# Patient Record
Sex: FEMALE | Race: WHITE | HISPANIC OR LATINO | Employment: FULL TIME | URBAN - METROPOLITAN AREA
[De-identification: names, ages, dates, MRNs, and addresses within clinical notes are randomized per-mention and may not be internally consistent; named-entity substitution may affect disease eponyms.]

---

## 2018-02-10 ENCOUNTER — OFFICE VISIT (OUTPATIENT)
Dept: URGENT CARE | Facility: MEDICAL CENTER | Age: 18
End: 2018-02-10
Payer: COMMERCIAL

## 2018-02-10 VITALS
DIASTOLIC BLOOD PRESSURE: 60 MMHG | HEIGHT: 61 IN | BODY MASS INDEX: 23.79 KG/M2 | RESPIRATION RATE: 16 BRPM | HEART RATE: 76 BPM | OXYGEN SATURATION: 100 % | WEIGHT: 126 LBS | SYSTOLIC BLOOD PRESSURE: 118 MMHG | TEMPERATURE: 97.4 F

## 2018-02-10 DIAGNOSIS — IMO0001 AVULSION OF NAIL PLATE, INITIAL ENCOUNTER: Primary | ICD-10-CM

## 2018-02-10 PROCEDURE — G0463 HOSPITAL OUTPT CLINIC VISIT: HCPCS | Performed by: FAMILY MEDICINE

## 2018-02-10 PROCEDURE — G0382 LEV 3 HOSP TYPE B ED VISIT: HCPCS | Performed by: FAMILY MEDICINE

## 2018-02-11 NOTE — PATIENT INSTRUCTIONS
Patient had partial avulsion of the nail plate of the left 4th finger which was hanging by  Cuticle margin which was excised with scissors without any immediate complication  Sterile dressing applied  Advised to keep area clean and dry and watch for any signs of infection and if any, follow up with primary care doctor for evaluation  Tetanus shot up-to-date according to patient  May use Tylenol/ Motrin as needed for pain and discomfort

## 2018-02-11 NOTE — PROGRESS NOTES
Assessment/Plan:  Patient Instructions    Patient had partial avulsion of the nail plate of the left 4th finger which was hanging by  Cuticle margin which was excised with scissors without any immediate complication  Sterile dressing applied  Advised to keep area clean and dry and watch for any signs of infection and if any, follow up with primary care doctor for evaluation  Tetanus shot up-to-date according to patient  May use Tylenol/ Motrin as needed for pain and discomfort  No problem-specific Assessment & Plan notes found for this encounter  Diagnoses and all orders for this visit:    Avulsion of nail plate, initial encounter          Vitals:    02/10/18 1936   BP: 118/60   Pulse: 76   Resp: 16   Temp: (!) 97 4 °F (36 3 °C)   SpO2: 100%       Subjective:      Patient ID: Jameson Chaney is a 21 y o  female  Patient states that she was putting her  baby in the car seat  When her o acrylic nail of the left 4th finger bent backwards and now she has pain and mild bleeding in the same area for which she is coming  She denies any other significant injury to hand or finger  She is able to flex and extend her finger without any significant pain or discomfort  The following portions of the patient's history were reviewed and updated as appropriate:   She  has no past medical history on file  No current outpatient prescriptions on file  No current facility-administered medications for this visit  No current outpatient prescriptions on file prior to visit  No current facility-administered medications on file prior to visit       Review of Systems   Constitutional: Negative for activity change and appetite change  Fever: As noted in HPI  Respiratory: Cough: As noted in HPI  Gastrointestinal: Negative for abdominal pain, diarrhea and nausea  Objective:     Physical Exam   Constitutional: She appears well-developed and well-nourished     Musculoskeletal: Left hand: She exhibits tenderness  She exhibits normal range of motion, no bony tenderness, normal capillary refill, no deformity, no laceration and no swelling  Decreased sensation is not present in the ulnar distribution, is not present in the medial redistribution and is not present in the radial distribution  She exhibits no finger abduction and no thumb/finger opposition  Hands:      Nail plate is fractured at the prox end of  Acrylic nail and avulsed from underlying nailbed and hanging by cuticle margin which was excised using a scissors without any immediate complications  Area was cleansed with Betadine and sterile dressing applied      Jossie Hinds MD

## 2019-06-27 ENCOUNTER — HOSPITAL ENCOUNTER (EMERGENCY)
Facility: HOSPITAL | Age: 19
Discharge: HOME | End: 2019-06-27
Attending: EMERGENCY MEDICINE
Payer: COMMERCIAL

## 2019-06-27 ENCOUNTER — APPOINTMENT (EMERGENCY)
Dept: RADIOLOGY | Facility: HOSPITAL | Age: 19
End: 2019-06-27
Payer: COMMERCIAL

## 2019-06-27 VITALS
TEMPERATURE: 98.6 F | HEART RATE: 87 BPM | RESPIRATION RATE: 16 BRPM | BODY MASS INDEX: 26.49 KG/M2 | DIASTOLIC BLOOD PRESSURE: 59 MMHG | SYSTOLIC BLOOD PRESSURE: 122 MMHG | WEIGHT: 140.21 LBS | OXYGEN SATURATION: 100 %

## 2019-06-27 DIAGNOSIS — S93.402A LEFT ANKLE SPRAIN: Primary | ICD-10-CM

## 2019-06-27 PROCEDURE — 73610 X-RAY EXAM OF ANKLE: CPT

## 2019-06-27 PROCEDURE — 99283 EMERGENCY DEPT VISIT LOW MDM: CPT | Performed by: PHYSICIAN ASSISTANT

## 2019-06-27 PROCEDURE — 99283 EMERGENCY DEPT VISIT LOW MDM: CPT

## 2019-06-27 RX ORDER — IBUPROFEN 600 MG/1
600 TABLET ORAL EVERY 8 HOURS PRN
Qty: 30 TABLET | Refills: 0 | Status: SHIPPED | OUTPATIENT
Start: 2019-06-27 | End: 2019-07-04

## 2019-06-28 NOTE — ED PROVIDER NOTES
History  Chief Complaint   Patient presents with    Ankle Injury     Pt reports " I twisted my left ankle around 5pm", pt reports swelling  24-year-old female presents emergency room for evaluation of left ankle injury  Patient states she is walking on her mom's deck when she twisted it  Incident happened earlier today around 5:00 p m  Jose Alejandro Wagner Denies knee pain  Denies other injury  She states it is very swollen  She is able to move it however only in the 1 direction  Denies previous left ankle surgery  Able to bear weight  Ankle Injury   Associated symptoms: no fever and no rash        Prior to Admission Medications   Prescriptions Last Dose Informant Patient Reported? Taking? methylPREDNISolone (MEDROL DOSEPACK) 4 MG tablet   No No   Sig: follow package directions start tomorrow      Facility-Administered Medications: None       Past Medical History:   Diagnosis Date    No known health problems        History reviewed  No pertinent surgical history  History reviewed  No pertinent family history  I have reviewed and agree with the history as documented  Social History     Tobacco Use    Smoking status: Never Smoker    Smokeless tobacco: Never Used   Substance Use Topics    Alcohol use: Yes     Comment: ocass   Drug use: No        Review of Systems   Constitutional: Negative for chills and fever  Musculoskeletal: Positive for joint swelling  Skin: Negative for rash and wound  Neurological: Negative for weakness and numbness  Physical Exam  Physical Exam   Constitutional: She appears well-developed and well-nourished  Cardiovascular: Intact distal pulses  Musculoskeletal:        Left knee: Normal         Left ankle: She exhibits decreased range of motion, swelling and ecchymosis  She exhibits no deformity and normal pulse  Tenderness  Lateral malleolus tenderness found  No head of 5th metatarsal and no proximal fibula tenderness found   Achilles tendon normal         Left foot: Normal    Skin: Skin is warm and dry  Nursing note and vitals reviewed  Vital Signs  ED Triage Vitals   Temperature Pulse Respirations Blood Pressure SpO2   06/27/19 2240 06/27/19 2242 06/27/19 2242 06/27/19 2242 06/27/19 2242   98 6 °F (37 °C) 87 16 122/59 100 %      Temp Source Heart Rate Source Patient Position - Orthostatic VS BP Location FiO2 (%)   06/27/19 2240 06/27/19 2242 06/27/19 2242 06/27/19 2242 --   Temporal Monitor Sitting Right arm       Pain Score       06/27/19 2242       9           Vitals:    06/27/19 2242   BP: 122/59   Pulse: 87   Patient Position - Orthostatic VS: Sitting         Visual Acuity      ED Medications  Medications - No data to display    Diagnostic Studies  Results Reviewed     None                 XR ankle 3+ views LEFT   ED Interpretation by Gene Rodgers PA-C (06/27 2284)   NAD                 Procedures  Procedures   Ace wrap, air cast, and crutches provided by the nurse  ED Course                               MDM  Number of Diagnoses or Management Options  Left ankle sprain:      Amount and/or Complexity of Data Reviewed  Tests in the radiology section of CPT®: reviewed    Risk of Complications, Morbidity, and/or Mortality  General comments: Discussed with patient ice, Ace wrap, elevation, ibuprofen, rest, and range-of-motion exercises  Patient Progress  Patient progress: stable      Disposition  Final diagnoses:   Left ankle sprain     Time reflects when diagnosis was documented in both MDM as applicable and the Disposition within this note     Time User Action Codes Description Comment    6/27/2019 11:35 PM Lyndsay Siegel Add [[id number]] Left ankle sprain       ED Disposition     ED Disposition Condition Date/Time Comment    Discharge Stable Thu Jun 27, 2019 11:35 PM Justine Rueda discharge to home/self care              Follow-up Information     Follow up With Specialties Details Why Contact Info Additional [address] [facility] Orthopedic Surgery In 3 days  [address] [address] [address] [facility], [address] [place] [address] Rd,  [address] [facility], [place], [place], [address] [address] Emergency Department Emergency Medicine  If symptoms worsen [facility] [address] [phone] [place] ED, [address] [facility] [address]  , [place], [place], [address]          Patient's Medications   Discharge Prescriptions    IBUPROFEN (MOTRIN) 600 MG TABLET    Take 1 tablet (600 mg total) by mouth every 8 (eight) hours as needed for mild pain or moderate pain (with food) for up to 7 days       Start Date: 6/27/2019 End Date: 7/4/2019       Order Dose: 600 mg       Quantity: 30 tablet    Refills: 0     No discharge procedures on file      ED Provider  Electronically Signed by           Ary Villatoro PA-C  06/27/19 7569

## 2019-09-24 ENCOUNTER — OFFICE VISIT (OUTPATIENT)
Dept: OBGYN CLINIC | Facility: CLINIC | Age: 19
End: 2019-09-24

## 2019-09-24 VITALS
DIASTOLIC BLOOD PRESSURE: 74 MMHG | HEART RATE: 67 BPM | SYSTOLIC BLOOD PRESSURE: 107 MMHG | BODY MASS INDEX: 27.55 KG/M2 | WEIGHT: 145.8 LBS

## 2019-09-24 DIAGNOSIS — Z00.00 ANNUAL PHYSICAL EXAM: Primary | ICD-10-CM

## 2019-09-24 DIAGNOSIS — Z72.51 HIGH RISK HETEROSEXUAL BEHAVIOR: ICD-10-CM

## 2019-09-24 PROCEDURE — G0145 SCR C/V CYTO,THINLAYER,RESCR: HCPCS | Performed by: OBSTETRICS & GYNECOLOGY

## 2019-09-24 PROCEDURE — 87491 CHLMYD TRACH DNA AMP PROBE: CPT | Performed by: OBSTETRICS & GYNECOLOGY

## 2019-09-24 PROCEDURE — 99203 OFFICE O/P NEW LOW 30 MIN: CPT | Performed by: OBSTETRICS & GYNECOLOGY

## 2019-09-24 PROCEDURE — 87591 N.GONORRHOEAE DNA AMP PROB: CPT | Performed by: OBSTETRICS & GYNECOLOGY

## 2019-09-24 RX ORDER — MEDROXYPROGESTERONE ACETATE 150 MG/ML
150 INJECTION, SUSPENSION INTRAMUSCULAR
COMMUNITY
End: 2019-09-24 | Stop reason: SDUPTHER

## 2019-09-24 RX ORDER — MEDROXYPROGESTERONE ACETATE 150 MG/ML
150 INJECTION, SUSPENSION INTRAMUSCULAR
Qty: 1 ML | Refills: 3 | Status: SHIPPED | OUTPATIENT
Start: 2019-09-24 | End: 2020-09-24

## 2019-09-24 NOTE — PROGRESS NOTES
Subjective      Clint Zamora is a 22 y o  female who presents for annual well woman exam  Periods are rare due depo-provera  Her periods prior to  lasting 4 days  No intermenstrual bleeding, spotting, or discharge  Current contraception: Depo-Provera injections  History of abnormal Pap smear: no  Family history of uterine or ovarian cancer: no  Regular self breast exam: no  History of abnormal mammogram: N/A  Family history of breast cancer: no  History of abnormal lipids: no  Menstrual History:  OB History        1    Para   1    Term   1            AB        Living   1       SAB        TAB        Ectopic        Multiple        Live Births   1                Menarche age: 13  No LMP recorded (lmp unknown)  (Menstrual status: Birth Control)  Period Cycle (Days): 28  Period Duration (Days): 4  Period Pattern: Regular  Menstrual Flow: Moderate  Menstrual Control: Maxi pad  Dysmenorrhea: (!) Mild  Dysmenorrhea Symptoms: Cramping    The following portions of the patient's history were reviewed and updated as appropriate: current medications, past family history, past medical history, past social history and past surgical history  Review of Systems  Respiratory: negative  Cardiovascular: negative  Gastrointestinal: negative  Genitourinary:negative      Objective      /74   Pulse 67   Wt 66 1 kg (145 lb 12 8 oz)   LMP  (LMP Unknown)   Breastfeeding?  No   BMI 27 55 kg/m²     General:   alert and oriented, in no acute distress, alert, appears stated age and cooperative   Heart: regular rate and rhythm, S1, S2 normal, no murmur, click, rub or gallop   Lungs: clear to auscultation bilaterally   Abdomen: soft, non-tender, without masses or organomegaly   Vulva: normal   Vagina: normal mucosa, normal discharge   Cervix: no cervical motion tenderness and no lesions   Uterus: normal size   Adnexa: normal adnexa             Assessment   Clint Zamora is a 22 y o  female who presents for annual well woman exam       Plan   1) Annual Exam   -Pap smear and STD testing   -Return tomorrow for urine pregnancy and Depo shot

## 2019-09-25 ENCOUNTER — CLINICAL SUPPORT (OUTPATIENT)
Dept: OBGYN CLINIC | Facility: CLINIC | Age: 19
End: 2019-09-25

## 2019-09-25 VITALS — DIASTOLIC BLOOD PRESSURE: 87 MMHG | WEIGHT: 146 LBS | BODY MASS INDEX: 27.59 KG/M2 | SYSTOLIC BLOOD PRESSURE: 108 MMHG

## 2019-09-25 DIAGNOSIS — IMO0001 CONTRACEPTION: Primary | ICD-10-CM

## 2019-09-25 PROCEDURE — 96372 THER/PROPH/DIAG INJ SC/IM: CPT | Performed by: OBSTETRICS & GYNECOLOGY

## 2019-09-25 RX ORDER — MEDROXYPROGESTERONE ACETATE 150 MG/ML
150 INJECTION, SUSPENSION INTRAMUSCULAR ONCE
Status: COMPLETED | OUTPATIENT
Start: 2019-09-25 | End: 2019-09-25

## 2019-09-25 RX ADMIN — MEDROXYPROGESTERONE ACETATE 150 MG: 150 INJECTION, SUSPENSION INTRAMUSCULAR at 17:07

## 2019-09-26 LAB
C TRACH DNA SPEC QL NAA+PROBE: NEGATIVE
N GONORRHOEA DNA SPEC QL NAA+PROBE: NEGATIVE

## 2019-09-30 LAB
LAB AP GYN PRIMARY INTERPRETATION: NORMAL
Lab: NORMAL

## 2020-02-18 ENCOUNTER — TELEPHONE (OUTPATIENT)
Dept: OBGYN CLINIC | Facility: CLINIC | Age: 20
End: 2020-02-18